# Patient Record
(demographics unavailable — no encounter records)

---

## 2024-12-13 NOTE — HISTORY OF PRESENT ILLNESS
[de-identified] : Date of initial evaluation: 12/13/2024 Patient age: 75 year Body part causing symptoms: Left knee Location of the pain: anterior, medial Pain score today: 9/10 Duration of symptoms: about 10 years, on and off History of injury: no known injury  Activities that worsen the pain: standing, exercise, sleep, flexion Radicular symptoms: none  Medications attempted for this problem: none  PT for this problem: none  Injections for this problem: none  Previous surgery on this body part: none  Prior imaging: none  Occupation: retired

## 2024-12-13 NOTE — IMAGING
[de-identified] : (Exam: Knee)   Laterality is left   Patient is in no acute distress, alert and oriented Sensation is grossly intact to light touch in the foot Motor function is 5/5 in the foot Capillary refill is less than 2 seconds in the toes Lymphadenopathy is not present Peripheral edema is not present   Skin is intact Effusion is trace Atrophy is not present Antalgic gait is present   Medial joint line tenderness is present Lateral joint line tenderness is present Patellar tenderness is present Calf tenderness is not present   Knee extension is 3 Knee flexion is 120   Quadriceps strength is 5/5 Hamstring strength is 5/5   Lachman is normal Posterior drawer is normal Varus stress stability is normal Valgus stress stability is normal   Medial Lenin test is abnormal Lateral Lenin test is abnormal Patellofemoral grind test is abnormal Patellar apprehension test is normal [Left] : left knee [All Views] : anteroposterior, lateral, skyline, and anteroposterior standing [advanced tricompartmental OA with medial compartment narrowing and varus alignment] : advanced tricompartmental OA with medial compartment narrowing and varus alignment

## 2024-12-13 NOTE — DISCUSSION/SUMMARY
[de-identified] : (Impression) -left knee osteoarthritis    The diagnosis was explained in detail. The potential non-surgical and surgical treatments were reviewed. The relative risks and benefits of each option were considered relative to the patient age, activity level, medical history, symptom severity and previously attempted treatments.  The patient was advised to consult with their primary medical provider prior to initiation of any new medications to reduce the risk of adverse effects specific to their long-term home medications and medical history. The risk of gastrointestinal irritation and kidney injury specific to long-term NSAID use was discussed.    (Plan)  -Physical therapy recommended for stretching and strengthening. -Cortisone injection performed for symptom relief. -Over the counter NSAID for pain and inflammation, take as needed. -Follow up in 6 to 8 weeks. If symptoms persist consider HA vs TKA referral.      (MDM) Problem Complexity -Moderate: chronic illness with exacerbation   Risk -Moderate: injection   Visit Type -New: Patient has not been seen by another provider in my practice within the past 2 years who specializes in orthopedic surgery.  (Procedure: Corticosteroid Injection)   Injection location was the: -left knee joint   Injection contents were: 40 mg of kenalog and 5 mL of 1% lidocaine   Procedure Details: -Informed consent was obtained. Discussed possible risks of corticosteroid injection including hyperglycemia, infection and skin discoloration. -Discussed that due to infection risk, an interval between injection and any future surgery is 6 weeks for arthroscopy and 3 months for arthroplasty. -Injection performed using aseptic technique. Hemostasis was confirmed. -Procedure was tolerated well with no complications.

## 2025-01-14 NOTE — REVIEW OF SYSTEMS
[Joint Pain] : joint pain [Joint Stiffness] : joint stiffness [Negative] : Heme/Lymph [de-identified] : tremors, stiffness, off-balance

## 2025-01-14 NOTE — PHYSICAL EXAM
[No Acute Distress] : no acute distress [Well Nourished] : well nourished [Well Developed] : well developed [Well-Appearing] : well-appearing [Normal Voice/Communication] : normal voice/communication [No Lymphadenopathy] : no lymphadenopathy [Supple] : supple [No Respiratory Distress] : no respiratory distress  [No Accessory Muscle Use] : no accessory muscle use [Clear to Auscultation] : lungs were clear to auscultation bilaterally [Normal Rate] : normal rate  [Regular Rhythm] : with a regular rhythm [Normal S1, S2] : normal S1 and S2 [No Edema] : there was no peripheral edema [Soft] : abdomen soft [Non-distended] : non-distended [Normal Mood] : the mood was normal

## 2025-01-14 NOTE — HISTORY OF PRESENT ILLNESS
[de-identified] : She just started a new Parkinson's medication but had a side effect.  She will be having the left knee replaced on 3/3.  She never started the Losartan HCT and stayed on the Losartan

## 2025-01-14 NOTE — PLAN
[FreeTextEntry1] : I administered a high dose flu vaccine.  Fasting labs were ordered.  She will follow up for medical clearance.

## 2025-01-14 NOTE — HEALTH RISK ASSESSMENT
[Very Good] : ~his/her~  mood as very good [Intercurrent Urgi Care visits] : went to urgent care [No] : In the past 12 months have you used drugs other than those required for medical reasons? No [One fall no injury in past year] : Patient reported one fall in the past year without injury [0] : 2) Feeling down, depressed, or hopeless: Not at all (0) [PHQ-2 Negative - No further assessment needed] : PHQ-2 Negative - No further assessment needed [Never] : Never [NO] : No [HIV test declined] : HIV test declined [Hepatitis C test declined] : Hepatitis C test declined [None] : None [With Significant Other] : lives with significant other [Retired] : retired [] :  [Feels Safe at Home] : Feels safe at home [Fully functional (bathing, dressing, toileting, transferring, walking, feeding)] : Fully functional (bathing, dressing, toileting, transferring, walking, feeding) [Fully functional (using the telephone, shopping, preparing meals, housekeeping, doing laundry, using] : Fully functional and needs no help or supervision to perform IADLs (using the telephone, shopping, preparing meals, housekeeping, doing laundry, using transportation, managing medications and managing finances) [Reports normal functional visual acuity (ie: able to read med bottle)] : Reports normal functional visual acuity [Smoke Detector] : smoke detector [Carbon Monoxide Detector] : carbon monoxide detector [Safety elements used in home] : safety elements used in home [Seat Belt] :  uses seat belt [Sunscreen] : uses sunscreen [Patient/Caregiver not ready to engage] : , patient/caregiver not ready to engage [FreeTextEntry1] : none [de-identified] : for a cough [de-identified] : [place] Kaye([place]tho[place] [facility] [place] Dr Khoury (Neuro), [facility] [de-identified] : she was in PT [de-identified] : regular [de-identified] : she lost her balance [Change in mental status noted] : No change in mental status noted [Language] : denies difficulty with language [Behavior] : denies difficulty with behavior [Learning/Retaining New Information] : denies difficulty learning/retaining new information [Handling Complex Tasks] : denies difficulty handling complex tasks [Reasoning] : denies difficulty with reasoning [Spatial Ability and Orientation] : denies difficulty with spatial ability and orientation [Reports changes in hearing] : Reports no changes in hearing [Reports changes in vision] : Reports no changes in vision [Reports changes in dental health] : Reports no changes in dental health [Travel to Developing Areas] : does not  travel to developing areas [TB Exposure] : is not being exposed to tuberculosis [Caregiver Concerns] : does not have caregiver concerns [MammogramDate] : 2022 [ColonoscopyDate] : 2012 [AdvancecareDate] : 01/25

## 2025-02-24 NOTE — ASSESSMENT
[Patient Optimized for Surgery] : Patient optimized for surgery [Continue medications as is] : Continue current medications [As per surgery] : as per surgery [FreeTextEntry4] : The physical examination is stable. Once I receive the preop test results I will complete an addendum to this note with full clearance.

## 2025-02-24 NOTE — PHYSICAL EXAM
[No Acute Distress] : no acute distress [Well Nourished] : well nourished [Well Developed] : well developed [Well-Appearing] : well-appearing [Normal Voice/Communication] : normal voice/communication [Normal Sclera/Conjunctiva] : normal sclera/conjunctiva [No Lymphadenopathy] : no lymphadenopathy [Supple] : supple [No Respiratory Distress] : no respiratory distress  [No Accessory Muscle Use] : no accessory muscle use [Clear to Auscultation] : lungs were clear to auscultation bilaterally [Normal Rate] : normal rate  [Regular Rhythm] : with a regular rhythm [Normal S1, S2] : normal S1 and S2 [No Edema] : there was no peripheral edema [Soft] : abdomen soft [Non Tender] : non-tender [Non-distended] : non-distended [Normal Mood] : the mood was normal [de-identified] : has involuntary movements, walking well with a rolling walker

## 2025-02-24 NOTE — PHYSICAL EXAM
[No Acute Distress] : no acute distress [Well Nourished] : well nourished [Well Developed] : well developed [Well-Appearing] : well-appearing [Normal Voice/Communication] : normal voice/communication [Normal Sclera/Conjunctiva] : normal sclera/conjunctiva [No Lymphadenopathy] : no lymphadenopathy [Supple] : supple [No Respiratory Distress] : no respiratory distress  [No Accessory Muscle Use] : no accessory muscle use [Clear to Auscultation] : lungs were clear to auscultation bilaterally [Normal Rate] : normal rate  [Regular Rhythm] : with a regular rhythm [Normal S1, S2] : normal S1 and S2 [No Edema] : there was no peripheral edema [Soft] : abdomen soft [Non Tender] : non-tender [Non-distended] : non-distended [Normal Mood] : the mood was normal [de-identified] : has involuntary movements, walking well with a rolling walker

## 2025-02-24 NOTE — HISTORY OF PRESENT ILLNESS
[No Pertinent Cardiac History] : no history of aortic stenosis, atrial fibrillation, coronary artery disease, recent myocardial infarction, or implantable device/pacemaker [No Pertinent Pulmonary History] : no history of asthma, COPD, sleep apnea, or smoking [No Adverse Anesthesia Reaction] : no adverse anesthesia reaction in self or family member [(Patient denies any chest pain, claudication, dyspnea on exertion, orthopnea, palpitations or syncope)] : Patient denies any chest pain, claudication, dyspnea on exertion, orthopnea, palpitations or syncope [Chronic Anticoagulation] : no chronic anticoagulation [Chronic Kidney Disease] : no chronic kidney disease [Diabetes] : no diabetes [FreeTextEntry1] : left total knee replacement [FreeTextEntry2] : 3/5/25/[facility] [FreeTextEntry3] : Dr Kaye [FreeTextEntry5] : she has hypertension

## 2025-02-24 NOTE — ADDENDUM
[FreeTextEntry1] : I reviewed the preop labs and EKG.  The BUN is elevated, and she will be advised to increase hydration.  I compared the EKG to a prior one performed on 5/23/22 and it is stable.  She is medically cleared for surgery.

## 2025-05-21 NOTE — REVIEW OF SYSTEMS
[Suicidal] : not suicidal [Anxiety] : anxiety [Depression] : no depression [Negative] : Heme/Lymph [de-identified] : abnormal movements

## 2025-05-21 NOTE — PLAN
[FreeTextEntry1] : The hypertension is stable, and she will continue taking Losartan 100 mg daily.  She was advised to call when she gets home with the full list of her current medications. The cardiology notes and the most recent lab results including CBC, CMP, lipids and TSH were all reviewed today.

## 2025-05-21 NOTE — HISTORY OF PRESENT ILLNESS
[FreeTextEntry1] :  patient presents for 3 months follow up  No Complaints at this moment  colonoscopy - n/a  mammo - N/A  pap- N/A [de-identified] : After she was discharged home after the knee replacement surgery, she was staying things that she didn't mean to say.  She had been on new medication for the Parkinson's.  She called the neurologist four times but didn't hear back. Then she became very agitated and was brought to the hospital and admitted.  They felt that it was due to the new medication.  She saw a psychiatrist and was told she was mentally sound. She plans to start counseling.  She started seeing a new neurologist.  She doesn't have a current medication list with her today.